# Patient Record
Sex: FEMALE | Race: WHITE | Employment: OTHER | URBAN - METROPOLITAN AREA
[De-identification: names, ages, dates, MRNs, and addresses within clinical notes are randomized per-mention and may not be internally consistent; named-entity substitution may affect disease eponyms.]

---

## 2024-04-09 ENCOUNTER — OFFICE VISIT (OUTPATIENT)
Dept: INTERNAL MEDICINE CLINIC | Facility: CLINIC | Age: 74
End: 2024-04-09
Payer: COMMERCIAL

## 2024-04-09 VITALS
SYSTOLIC BLOOD PRESSURE: 108 MMHG | BODY MASS INDEX: 24.54 KG/M2 | WEIGHT: 125 LBS | TEMPERATURE: 98.5 F | HEIGHT: 60 IN | DIASTOLIC BLOOD PRESSURE: 63 MMHG | HEART RATE: 76 BPM

## 2024-04-09 DIAGNOSIS — H40.013 OPEN ANGLE WITH BORDERLINE FINDINGS, LOW RISK, BILATERAL: ICD-10-CM

## 2024-04-09 DIAGNOSIS — Z76.89 ENCOUNTER TO ESTABLISH CARE: Primary | ICD-10-CM

## 2024-04-09 DIAGNOSIS — E87.6 HYPOKALEMIA: ICD-10-CM

## 2024-04-09 DIAGNOSIS — Z00.00 ANNUAL PHYSICAL EXAM: ICD-10-CM

## 2024-04-09 DIAGNOSIS — Z12.31 ENCOUNTER FOR SCREENING MAMMOGRAM FOR BREAST CANCER: ICD-10-CM

## 2024-04-09 DIAGNOSIS — M81.6 LOCALIZED OSTEOPOROSIS WITHOUT CURRENT PATHOLOGICAL FRACTURE: ICD-10-CM

## 2024-04-09 DIAGNOSIS — E78.5 DYSLIPIDEMIA: ICD-10-CM

## 2024-04-09 DIAGNOSIS — Z11.59 NEED FOR HEPATITIS C SCREENING TEST: ICD-10-CM

## 2024-04-09 PROBLEM — I5A MYOCARDIAL INJURY: Status: ACTIVE | Noted: 2023-10-20

## 2024-04-09 PROBLEM — Z78.9 VEGETARIAN DIET: Status: ACTIVE | Noted: 2022-02-15

## 2024-04-09 PROBLEM — H02.834 DERMATOCHALASIS OF BOTH UPPER EYELIDS: Status: ACTIVE | Noted: 2023-03-09

## 2024-04-09 PROBLEM — R10.9 RIGHT FLANK PAIN: Status: ACTIVE | Noted: 2023-10-20

## 2024-04-09 PROBLEM — M81.0 OSTEOPOROSIS: Status: ACTIVE | Noted: 2023-04-04

## 2024-04-09 PROBLEM — R79.89 ELEVATED TROPONIN LEVEL NOT DUE MYOCARDIAL INFARCTION: Status: RESOLVED | Noted: 2023-10-23 | Resolved: 2024-04-09

## 2024-04-09 PROBLEM — R53.82 CHRONIC FATIGUE: Status: ACTIVE | Noted: 2022-02-15

## 2024-04-09 PROBLEM — H49.9 OPHTHALMOPLEGIA: Status: ACTIVE | Noted: 2023-03-09

## 2024-04-09 PROBLEM — R79.89 ELEVATED TROPONIN LEVEL NOT DUE MYOCARDIAL INFARCTION: Status: ACTIVE | Noted: 2023-10-23

## 2024-04-09 PROBLEM — M35.01 KERATOCONJUNCTIVITIS SICCA (HCC): Status: ACTIVE | Noted: 2023-03-09

## 2024-04-09 PROBLEM — H16.229 KERATOCONJUNCTIVITIS SICCA: Status: ACTIVE | Noted: 2023-03-09

## 2024-04-09 PROBLEM — R10.11 RUQ PAIN: Status: ACTIVE | Noted: 2023-10-20

## 2024-04-09 PROBLEM — H02.831 DERMATOCHALASIS OF BOTH UPPER EYELIDS: Status: ACTIVE | Noted: 2023-03-09

## 2024-04-09 PROBLEM — H25.13 AGE-RELATED NUCLEAR CATARACT OF BOTH EYES: Status: ACTIVE | Noted: 2023-03-09

## 2024-04-09 PROBLEM — E78.00 HYPERCHOLESTEREMIA: Status: ACTIVE | Noted: 2022-02-15

## 2024-04-09 PROBLEM — I5A MYOCARDIAL INJURY: Status: RESOLVED | Noted: 2023-10-20 | Resolved: 2024-04-09

## 2024-04-09 PROCEDURE — 99204 OFFICE O/P NEW MOD 45 MIN: CPT | Performed by: STUDENT IN AN ORGANIZED HEALTH CARE EDUCATION/TRAINING PROGRAM

## 2024-04-09 NOTE — PROGRESS NOTES
INTERNAL MEDICINE OFFICE VISIT NOTE  [facility] Internal Medicine [place]    NAME: Mansi Jensen  AGE: 73 y.o. SEX: female    DATE OF ENCOUNTER: 4/9/2024       Chief Complaint   Patient presents with    Establish Care     New Patient to [facility]      Previously following with a PCP at [facility].  Last visit - 11/2023    Previously diagnosed with, but not limited to:  Osteoporosis, dyslipidemia, hypokalemia cataracts of both eyes,      NO Mental Health hx    Shx: denies current or former Tobacco / vaping / marijuana / illict drug use  Alcohol use: Denies   Environmental exposures: NK  Retired - Owned [employer]   - less than a year,  Kids: 3 adults that live in [place]    Labs or imaging reports: Reviewed    Review of Systems  10 point ROS negative except per HPI    OBJECTIVE:  Vitals:    04/09/24 1518   BP: 108/63   BP Location: Left arm   Patient Position: Sitting   Cuff Size: Standard   Pulse: 76   Temp: 98.5 °F (36.9 °C)   Weight: 56.7 kg (125 lb)   Height: 5' (1.524 m)       Physical Exam:   GENERAL: NAD, Normal appearance.    NEUROLOGIC:  Alert/oriented x3.  HEENT:  NC/AT, no scleral icterus  CARDIAC:  RRR, +S1/S2  PULMONARY:  non-labored breathing    ASSESSMENT/PLAN:    1. Encounter to establish care    2. Need for hepatitis C screening test  -     Hepatitis C Antibody; Future    3. Encounter for screening mammogram for breast cancer    4. Localized osteoporosis without current pathological fracture  -     Vitamin D 25 hydroxy; Future    5. Annual physical exam  -     CBC; Future  -     Comprehensive metabolic panel; Future  -     TSH, 3rd generation with Free T4 reflex; Future  -     Hemoglobin A1C; Future  -     Lipid Panel with Direct LDL reflex; Future    6. Dyslipidemia  -     TSH, 3rd generation with Free T4 reflex; Future  -     Lipid Panel with Direct LDL reflex; Future    7. Hypokalemia  -     Comprehensive metabolic panel; Future    8. Open angle with borderline findings, low  risk, bilateral  Assessment & Plan:  Previously evaluated by ophthalmology however lost to follow-up.  She reports she is scheduled to establish with a new ophthalmologist in the next few weeks          Return in about 2 weeks (around 4/23/2024) for Annual physical.    No current outpatient medications on file.    TCM Call       None          TCM Call       None               Sandor Valles MD  [facility] Internal Medicine [place]    * Please Note: This note was completed in part utilizing a dictation software.  Grammatical errors, random word insertions, spelling mistakes, and incomplete sentences may be an occasional consequence of this system secondary to software limitations, ambient noise, and hardware issues.  If you have any questions or concerns about the content, text, or information contained within the body of this dictation, please contact the provider for clarification.**

## 2024-04-09 NOTE — ASSESSMENT & PLAN NOTE
Previously evaluated by ophthalmology however lost to follow-up.  She reports she is scheduled to establish with a new ophthalmologist in the next few weeks

## 2024-04-11 ENCOUNTER — APPOINTMENT (OUTPATIENT)
Dept: LAB | Facility: CLINIC | Age: 74
End: 2024-04-11
Payer: COMMERCIAL

## 2024-04-11 DIAGNOSIS — E78.5 DYSLIPIDEMIA: ICD-10-CM

## 2024-04-11 DIAGNOSIS — E87.6 HYPOKALEMIA: ICD-10-CM

## 2024-04-11 DIAGNOSIS — M81.6 LOCALIZED OSTEOPOROSIS WITHOUT CURRENT PATHOLOGICAL FRACTURE: ICD-10-CM

## 2024-04-11 DIAGNOSIS — Z11.59 NEED FOR HEPATITIS C SCREENING TEST: ICD-10-CM

## 2024-04-11 DIAGNOSIS — Z00.00 ANNUAL PHYSICAL EXAM: ICD-10-CM

## 2024-04-11 LAB
25(OH)D3 SERPL-MCNC: 41.4 NG/ML (ref 30–100)
ALBUMIN SERPL BCP-MCNC: 4.2 G/DL (ref 3.5–5)
ALP SERPL-CCNC: 70 U/L (ref 34–104)
ALT SERPL W P-5'-P-CCNC: 18 U/L (ref 7–52)
ANION GAP SERPL CALCULATED.3IONS-SCNC: 11 MMOL/L (ref 4–13)
AST SERPL W P-5'-P-CCNC: 26 U/L (ref 13–39)
BILIRUB SERPL-MCNC: 0.72 MG/DL (ref 0.2–1)
BUN SERPL-MCNC: 11 MG/DL (ref 5–25)
CALCIUM SERPL-MCNC: 9 MG/DL (ref 8.4–10.2)
CHLORIDE SERPL-SCNC: 104 MMOL/L (ref 96–108)
CHOLEST SERPL-MCNC: 231 MG/DL
CO2 SERPL-SCNC: 27 MMOL/L (ref 21–32)
CREAT SERPL-MCNC: 0.53 MG/DL (ref 0.6–1.3)
ERYTHROCYTE [DISTWIDTH] IN BLOOD BY AUTOMATED COUNT: 13 % (ref 11.6–15.1)
EST. AVERAGE GLUCOSE BLD GHB EST-MCNC: 111 MG/DL
GFR SERPL CREATININE-BSD FRML MDRD: 94 ML/MIN/1.73SQ M
GLUCOSE P FAST SERPL-MCNC: 87 MG/DL (ref 65–99)
HBA1C MFR BLD: 5.5 %
HCT VFR BLD AUTO: 37.2 % (ref 34.8–46.1)
HCV AB SER QL: NORMAL
HDLC SERPL-MCNC: 67 MG/DL
HGB BLD-MCNC: 12.5 G/DL (ref 11.5–15.4)
LDLC SERPL CALC-MCNC: 148 MG/DL (ref 0–100)
MCH RBC QN AUTO: 31.8 PG (ref 26.8–34.3)
MCHC RBC AUTO-ENTMCNC: 33.6 G/DL (ref 31.4–37.4)
MCV RBC AUTO: 95 FL (ref 82–98)
PLATELET # BLD AUTO: 278 THOUSANDS/UL (ref 149–390)
PMV BLD AUTO: 10.4 FL (ref 8.9–12.7)
POTASSIUM SERPL-SCNC: 4.2 MMOL/L (ref 3.5–5.3)
PROT SERPL-MCNC: 7 G/DL (ref 6.4–8.4)
RBC # BLD AUTO: 3.93 MILLION/UL (ref 3.81–5.12)
SODIUM SERPL-SCNC: 142 MMOL/L (ref 135–147)
TRIGL SERPL-MCNC: 79 MG/DL
TSH SERPL DL<=0.05 MIU/L-ACNC: 3.9 UIU/ML (ref 0.45–4.5)
WBC # BLD AUTO: 3.85 THOUSAND/UL (ref 4.31–10.16)

## 2024-04-11 PROCEDURE — 86803 HEPATITIS C AB TEST: CPT

## 2024-04-11 PROCEDURE — 84443 ASSAY THYROID STIM HORMONE: CPT

## 2024-04-11 PROCEDURE — 85027 COMPLETE CBC AUTOMATED: CPT

## 2024-04-11 PROCEDURE — 36415 COLL VENOUS BLD VENIPUNCTURE: CPT

## 2024-04-11 PROCEDURE — 83036 HEMOGLOBIN GLYCOSYLATED A1C: CPT

## 2024-04-11 PROCEDURE — 80061 LIPID PANEL: CPT

## 2024-04-11 PROCEDURE — 82306 VITAMIN D 25 HYDROXY: CPT

## 2024-04-11 PROCEDURE — 80053 COMPREHEN METABOLIC PANEL: CPT

## 2024-04-26 ENCOUNTER — OFFICE VISIT (OUTPATIENT)
Dept: INTERNAL MEDICINE CLINIC | Facility: CLINIC | Age: 74
End: 2024-04-26
Payer: COMMERCIAL

## 2024-04-26 VITALS
WEIGHT: 126 LBS | TEMPERATURE: 98.4 F | SYSTOLIC BLOOD PRESSURE: 106 MMHG | DIASTOLIC BLOOD PRESSURE: 59 MMHG | HEIGHT: 60 IN | BODY MASS INDEX: 24.74 KG/M2 | HEART RATE: 71 BPM

## 2024-04-26 DIAGNOSIS — M81.0 OSTEOPOROSIS OF LUMBAR SPINE: ICD-10-CM

## 2024-04-26 DIAGNOSIS — M35.01 KERATOCONJUNCTIVITIS SICCA (HCC): ICD-10-CM

## 2024-04-26 DIAGNOSIS — E78.5 DYSLIPIDEMIA: Primary | ICD-10-CM

## 2024-04-26 DIAGNOSIS — Z12.11 COLON CANCER SCREENING: ICD-10-CM

## 2024-04-26 PROBLEM — R53.82 CHRONIC FATIGUE: Status: RESOLVED | Noted: 2022-02-15 | Resolved: 2024-04-26

## 2024-04-26 PROBLEM — E78.00 HYPERCHOLESTEREMIA: Status: RESOLVED | Noted: 2022-02-15 | Resolved: 2024-04-26

## 2024-04-26 PROBLEM — E87.6 HYPOKALEMIA: Status: RESOLVED | Noted: 2023-10-20 | Resolved: 2024-04-26

## 2024-04-26 PROBLEM — M85.80 OSTEOPENIA: Status: ACTIVE | Noted: 2023-04-04

## 2024-04-26 PROBLEM — R10.11 RUQ PAIN: Status: RESOLVED | Noted: 2023-10-20 | Resolved: 2024-04-26

## 2024-04-26 PROCEDURE — 99214 OFFICE O/P EST MOD 30 MIN: CPT | Performed by: STUDENT IN AN ORGANIZED HEALTH CARE EDUCATION/TRAINING PROGRAM

## 2024-04-26 PROCEDURE — G0439 PPPS, SUBSEQ VISIT: HCPCS | Performed by: STUDENT IN AN ORGANIZED HEALTH CARE EDUCATION/TRAINING PROGRAM

## 2024-04-26 PROCEDURE — 3288F FALL RISK ASSESSMENT DOCD: CPT | Performed by: STUDENT IN AN ORGANIZED HEALTH CARE EDUCATION/TRAINING PROGRAM

## 2024-04-26 PROCEDURE — 1125F AMNT PAIN NOTED PAIN PRSNT: CPT | Performed by: STUDENT IN AN ORGANIZED HEALTH CARE EDUCATION/TRAINING PROGRAM

## 2024-04-26 PROCEDURE — 1170F FXNL STATUS ASSESSED: CPT | Performed by: STUDENT IN AN ORGANIZED HEALTH CARE EDUCATION/TRAINING PROGRAM

## 2024-04-26 PROCEDURE — 1159F MED LIST DOCD IN RCRD: CPT | Performed by: STUDENT IN AN ORGANIZED HEALTH CARE EDUCATION/TRAINING PROGRAM

## 2024-04-26 PROCEDURE — 1160F RVW MEDS BY RX/DR IN RCRD: CPT | Performed by: STUDENT IN AN ORGANIZED HEALTH CARE EDUCATION/TRAINING PROGRAM

## 2024-04-26 PROCEDURE — 3725F SCREEN DEPRESSION PERFORMED: CPT | Performed by: STUDENT IN AN ORGANIZED HEALTH CARE EDUCATION/TRAINING PROGRAM

## 2024-04-26 NOTE — PROGRESS NOTES
Assessment and Plan:     Problem List Items Addressed This Visit          Musculoskeletal and Integument    Osteoporosis of lumbar spine     She is currently on OTC vitamin D, currently not on calcium  We discussed initiation of calcium which she prefers to buy over-the-counter  -Additionally will start her on Fosamax 70 milligrams once a week.  Patient advised on proper technique to take this.  Patient voiced understanding.         Relevant Medications    calcium carbonate (OS-CODY) 600 MG tablet    alendronate (Fosamax) 70 mg tablet       Eye    Keratoconjunctivitis sicca (HCC)     She reports a long history of dry eyes  She is scheduled to see an ophthalmologist in a few weeks            Other    Dyslipidemia - Primary     Lab Results   Component Value Date    LDLCALC 148 (H) 04/11/2024     Ascvd RISK 9.8%  We discussed initiation of statin however she is reluctant to proceed at this time.    She prefers to do a trial of red yeast rice for some time.    We have agreed on repeating a lipid panel 1 week before her next visit in 6 months to help with guide our decision process during her next visit         Relevant Orders    Lipid Panel with Direct LDL reflex     Other Visit Diagnoses       Colon cancer screening        Relevant Orders    Cologuard             Preventive health issues were discussed with patient, and age appropriate screening tests were ordered as noted in patient's After Visit Summary.  Personalized health advice and appropriate referrals for health education or preventive services given if needed, as noted in patient's After Visit Summary.     History of Present Illness:     Patient presents for a Medicare Wellness Visit    HPI   Patient Care Team:  Sandor Valles MD as PCP - General (Internal Medicine)  Sandor Valles MD as PCP - PCP-[facility] ([facility])     Review of Systems:     Review of Systems     Problem List:     Patient Active Problem List   Diagnosis    Age-related nuclear cataract  of both eyes    Chronic fatigue    Dermatochalasis of both upper eyelids    Dyslipidemia    Hypercholesteremia    Hypokalemia    Keratoconjunctivitis sicca (HCC)    Open angle with borderline findings, low risk, bilateral    Ophthalmoplegia of left eye    Osteoporosis    RUQ pain    Vegetarian diet      Past Medical and Surgical History:     No past medical history on file.  No past surgical history on file.   Family History:     No family history on file.   Social History:     Social History     Socioeconomic History    Marital status: Single     Spouse name: None    Number of children: None    Years of education: None    Highest education level: None   Occupational History    None   Tobacco Use    Smoking status: Never    Smokeless tobacco: Never   Vaping Use    Vaping status: Never Used   Substance and Sexual Activity    Alcohol use: Never    Drug use: Never    Sexual activity: None   Other Topics Concern    None   Social History Narrative    None     Social Determinants of Health     Financial Resource Strain: Low Risk  (4/26/2024)    Overall Financial Resource Strain (CARDIA)     Difficulty of Paying Living Expenses: Not very hard   Food Insecurity: No Food Insecurity (10/20/2023)    Received from [facility]    Hunger Vital Sign     Worried About Running Out of Food in the Last Year: Never true     Ran Out of Food in the Last Year: Never true   Transportation Needs: No Transportation Needs (4/26/2024)    PRAPARE - Transportation     Lack of Transportation (Medical): No     Lack of Transportation (Non-Medical): No   Physical Activity: Not on file   Stress: Not on file   Social Connections: Not on file   Intimate Partner Violence: Not At Risk (10/20/2023)    Received from [facility]    Humiliation, Afraid, Rape, and Kick questionnaire     Fear of Current or Ex-Partner: No     Emotionally Abused: No     Physically Abused: No     Sexually Abused: No   Housing Stability: Unknown  (4/26/2024)    Housing Stability Vital Sign     Unable to Pay for Housing in the Last Year: No     Number of Places Lived in the Last Year: Not on file     Unstable Housing in the Last Year: No      Medications and Allergies:     No current outpatient medications on file.     No current facility-administered medications for this visit.     No Known Allergies   Immunizations:     Immunization History   Administered Date(s) Administered    COVID-19 PFIZER VACCINE 0.3 ML IM 06/02/2021, 06/23/2021      Health Maintenance:         Topic Date Due    Breast Cancer Screening: Mammogram  Never done    Colorectal Cancer Screening  Never done    Hepatitis C Screening  Completed         Topic Date Due    DTaP,Tdap,and Td Vaccines (1 - Tdap) Never done    Pneumococcal Vaccine: 65+ Years (1 of 1 - PCV) Never done    Influenza Vaccine (1) Never done    COVID-19 Vaccine (3 - 2023-24 season) 09/01/2023      Medicare Screening Tests and Risk Assessments:     Mansi is here for her Initial Wellness visit.     Health Risk Assessment:   Patient rates overall health as very good. Patient feels that their physical health rating is same. Patient is satisfied with their life. Eyesight was rated as same. Hearing was rated as same. Patient feels that their emotional and mental health rating is same. Patients states they are never, rarely angry. Patient states they are sometimes unusually tired/fatigued. Pain experienced in the last 7 days has been none. Patient states that she has experienced no weight loss or gain in last 6 months.     Depression Screening:   PHQ-2 Score: 0      Fall Risk Screening:   In the past year, patient has experienced: history of falling in past year    Number of falls: 1  Injured during fall?: Yes    Feels unsteady when standing or walking?: No    Worried about falling?: No      Urinary Incontinence Screening:   Patient has not leaked urine accidently in the last six months.     Home Safety:  Patient does not have  trouble with stairs inside or outside of their home. Patient has working smoke alarms and has working carbon monoxide detector. Home safety hazards include: none.     Nutrition:   Current diet is Regular.     Medications:   Patient is currently taking over-the-counter supplements. OTC medications include: see medication list. Patient is able to manage medications.     Activities of Daily Living (ADLs)/Instrumental Activities of Daily Living (IADLs):   Walk and transfer into and out of bed and chair?: Yes  Dress and groom yourself?: Yes    Bathe or shower yourself?: Yes    Feed yourself? Yes  Do your laundry/housekeeping?: Yes  Manage your money, pay your bills and track your expenses?: Yes  Make your own meals?: Yes    Do your own shopping?: Yes    Previous Hospitalizations:   Any hospitalizations or ED visits within the last 12 months?: Yes    How many hospitalizations have you had in the last year?: 1-2    Advance Care Planning:   Living will: No    Durable POA for healthcare: No    Advanced directive: No    Advanced directive counseling given: Yes    ACP document given: Yes      PREVENTIVE SCREENINGS      Cardiovascular Screening:    General: Screening Not Indicated and History Lipid Disorder      Diabetes Screening:     General: Screening Current      Colorectal Cancer Screening:     General: Risks and Benefits Discussed    Due for: Cologuard      Cervical Cancer Screening:    General: Screening Not Indicated      Osteoporosis Screening:    General: Screening Not Indicated and History Osteoporosis      Lung Cancer Screening:     General: Screening Not Indicated      Hepatitis C Screening:    General: Screening Current    Screening, Brief Intervention, and Referral to Treatment (SBIRT)    Screening  Typical number of drinks in a day: 0  Typical number of drinks in a week: 0  Interpretation: Low risk drinking behavior.    Single Item Drug Screening:  How often have you used an illegal drug (including marijuana) or  a prescription medication for non-medical reasons in the past year? never    Single Item Drug Screen Score: 0  Interpretation: Negative screen for possible drug use disorder    Other Counseling Topics:   Sunscreen and calcium and vitamin D intake.     No results found.     Physical Exam:     /59 (BP Location: Left arm, Patient Position: Sitting, Cuff Size: Standard)   Pulse 71   Temp 98.4 °F (36.9 °C)   Ht 5' (1.524 m)   Wt 57.2 kg (126 lb)   BMI 24.61 kg/m²     Physical Exam     Sandor Valles MD

## 2024-04-26 NOTE — ASSESSMENT & PLAN NOTE
Lab Results   Component Value Date    LDLCALC 148 (H) 04/11/2024     Ascvd RISK 9.8%  We discussed initiation of statin however she is reluctant to proceed at this time.    She prefers to do a trial of red yeast rice for some time.    We have agreed on repeating a lipid panel 1 week before her next visit in 6 months to help with guide our decision process during her next visit

## 2024-04-26 NOTE — ASSESSMENT & PLAN NOTE
She is currently on OTC vitamin D, currently not on calcium  We discussed initiation of calcium which she prefers to buy over-the-counter  -Additionally will start her on Fosamax 70 milligrams once a week.  Patient advised on proper technique to take this.  Patient voiced understanding.

## 2024-04-29 RX ORDER — ALENDRONATE SODIUM 70 MG/1
70 TABLET ORAL
Qty: 12 TABLET | Refills: 3 | Status: SHIPPED | OUTPATIENT
Start: 2024-04-29

## 2024-04-29 RX ORDER — PHENOL 1.4 %
600 AEROSOL, SPRAY (ML) MUCOUS MEMBRANE 2 TIMES DAILY WITH MEALS
Qty: 180 TABLET | Refills: 1 | Status: SHIPPED | OUTPATIENT
Start: 2024-04-29

## 2024-05-01 ENCOUNTER — TELEPHONE (OUTPATIENT)
Age: 74
End: 2024-05-01

## 2024-05-01 ENCOUNTER — TELEPHONE (OUTPATIENT)
Dept: INTERNAL MEDICINE CLINIC | Facility: CLINIC | Age: 74
End: 2024-05-01

## 2024-05-01 NOTE — TELEPHONE ENCOUNTER
Patient would like to speak with you regarding the medications she received. She has several questions regarding the information she read and will not take them until she speaks with you.

## 2024-05-01 NOTE — TELEPHONE ENCOUNTER
Pt called in to schedule an appt tried to help her out she could roughly understand english. But she insists would want to speak to practice Chinese speaking staff. Warm transferred the call was answered by Kirsten. Thanks

## 2024-05-09 PROBLEM — H16.229 KERATOCONJUNCTIVITIS SICCA: Status: RESOLVED | Noted: 2023-03-09 | Resolved: 2024-05-09

## 2024-05-09 PROBLEM — M35.01 KERATOCONJUNCTIVITIS SICCA (HCC): Status: RESOLVED | Noted: 2023-03-09 | Resolved: 2024-05-09

## 2024-05-16 LAB — COLOGUARD RESULT REPORTABLE: NEGATIVE

## 2024-05-17 ENCOUNTER — TELEPHONE (OUTPATIENT)
Age: 74
End: 2024-05-17

## 2024-05-17 NOTE — TELEPHONE ENCOUNTER
Patient stated she received a message regarding her Cologuard test results.    Please review Cologuard done 5/8/24.    Thank you

## 2024-05-17 NOTE — TELEPHONE ENCOUNTER
Called patient back left message about results of Negative Cologuard. Advised any questions or concerns she is to call the office

## 2024-10-28 ENCOUNTER — OFFICE VISIT (OUTPATIENT)
Dept: INTERNAL MEDICINE CLINIC | Facility: CLINIC | Age: 74
End: 2024-10-28
Payer: COMMERCIAL

## 2024-10-28 ENCOUNTER — TELEPHONE (OUTPATIENT)
Dept: ADMINISTRATIVE | Facility: OTHER | Age: 74
End: 2024-10-28

## 2024-10-28 VITALS
SYSTOLIC BLOOD PRESSURE: 110 MMHG | HEART RATE: 64 BPM | HEIGHT: 60 IN | TEMPERATURE: 97.1 F | BODY MASS INDEX: 24.94 KG/M2 | DIASTOLIC BLOOD PRESSURE: 61 MMHG | WEIGHT: 127 LBS

## 2024-10-28 DIAGNOSIS — M81.0 OSTEOPOROSIS OF LUMBAR SPINE: ICD-10-CM

## 2024-10-28 DIAGNOSIS — R35.0 URINARY FREQUENCY: ICD-10-CM

## 2024-10-28 DIAGNOSIS — M67.911 TENDINOPATHY OF RIGHT SHOULDER: Primary | ICD-10-CM

## 2024-10-28 PROCEDURE — 99214 OFFICE O/P EST MOD 30 MIN: CPT | Performed by: STUDENT IN AN ORGANIZED HEALTH CARE EDUCATION/TRAINING PROGRAM

## 2024-10-28 PROCEDURE — G2211 COMPLEX E/M VISIT ADD ON: HCPCS | Performed by: STUDENT IN AN ORGANIZED HEALTH CARE EDUCATION/TRAINING PROGRAM

## 2024-10-28 RX ORDER — ALENDRONATE SODIUM 70 MG/1
70 TABLET ORAL
Qty: 24 TABLET | Refills: 1 | Status: SHIPPED | OUTPATIENT
Start: 2024-10-28

## 2024-10-28 RX ORDER — METHOCARBAMOL 750 MG/1
750 TABLET, FILM COATED ORAL
Qty: 30 TABLET | Refills: 0 | Status: SHIPPED | OUTPATIENT
Start: 2024-10-28 | End: 2024-11-27

## 2024-10-28 RX ORDER — MELOXICAM 15 MG/1
15 TABLET ORAL DAILY
Qty: 30 TABLET | Refills: 0 | Status: SHIPPED | OUTPATIENT
Start: 2024-10-28 | End: 2024-11-27

## 2024-10-28 NOTE — PROGRESS NOTES
INTERNAL MEDICINE OFFICE VISIT NOTE  [facility] Internal Medicine [place]    NAME: Mansi Jensen  AGE: 74 y.o. SEX: female    DATE OF ENCOUNTER:       Chief Complaint   Patient presents with    Follow-up     6 month follow up          Assessment & Plan  Tendinopathy of right shoulder  Complains of persistent mild to moderate right shoulder pain for the last few months.  Feels that this all started after working in the garden preparing some soil for planting over the spring.  She has been using OTC ointments without much improvement  Right shoulder active range of motion is within functional limits however she does experience some pain at the end of the range of shoulder flexion and internal rotation.  Negative drop arm test.  Suspect tendinopathy of the rotator cuff  Proceed with meloxicam 15 mg daily and methocarbamol 750 mg 1 hour before bedtime.  She may extend this up to 4 weeks as necessary.      Orders:    meloxicam (MOBIC) 15 mg tablet; Take 1 tablet (15 mg total) by mouth daily Take 1 tablet in the morning after breakfast - DO NOT REFILL    methocarbamol (Robaxin-750) 750 mg tablet; Take 1 tablet (750 mg total) by mouth daily at bedtime Take 1 tablet 1 hour before bedtime - allow 7-8 hrs of sleep that night.    Urinary frequency  Complains of small-volume urination and increased urinary frequency.  Denies dysuria, urgency or suprapubic discomfort.  Will proceed with ultrasound of her bladder with PVR and a UA    Orders:    US bladder with post void residual; Future    UA w Reflex to Microscopic w Reflex to Culture; Future    Osteoporosis of lumbar spine  Reports she ran out of of alendronate a few weeks ago and was not aware there was a refill available in the pharmacy.  I advised her to  the refill and restarted.  Continue supplemental calcium and vitamin D    Orders:    alendronate (Fosamax) 70 mg tablet; Take 1 tablet (70 mg total) by mouth every 7 days      Return in about 3 months  (around 1/28/2025).      OBJECTIVE:  Vitals:    10/28/24 1010   BP: 110/61   BP Location: Left arm   Patient Position: Sitting   Cuff Size: Standard   Pulse: 64   Temp: (!) 97.1 °F (36.2 °C)   Weight: 57.6 kg (127 lb)   Height: 5' (1.524 m)         Physical Exam:   GENERAL: NAD, Normal appearance.    NEUROLOGIC:  Alert/oriented x3.  HEENT:  NC/AT, no scleral icterus  CARDIAC:  RRR, +S1/S2  PULMONARY:  non-labored breathing        Current Outpatient Medications:     alendronate (Fosamax) 70 mg tablet, Take 1 tablet (70 mg total) by mouth every 7 days, Disp: 24 tablet, Rfl: 1    calcium carbonate (OS-CODY) 600 MG tablet, Take 1 tablet (600 mg total) by mouth 2 (two) times a day with meals, Disp: 180 tablet, Rfl: 1    cholecalciferol 1,000 units tablet, Take 1,000 Units by mouth daily, Disp: , Rfl:     meloxicam (MOBIC) 15 mg tablet, Take 1 tablet (15 mg total) by mouth daily Take 1 tablet in the morning after breakfast - DO NOT REFILL, Disp: 30 tablet, Rfl: 0    methocarbamol (Robaxin-750) 750 mg tablet, Take 1 tablet (750 mg total) by mouth daily at bedtime Take 1 tablet 1 hour before bedtime - allow 7-8 hrs of sleep that night., Disp: 30 tablet, Rfl: 0    Patient Active Problem List   Diagnosis    Age-related nuclear cataract of both eyes    Dermatochalasis of both upper eyelids    Dyslipidemia    Open angle with borderline findings, low risk, bilateral    Ophthalmoplegia of left eye    Osteoporosis of lumbar spine    Vegetarian diet    Tendinopathy of right shoulder    Urinary frequency             Sandor Valles MD  [facility] Internal Medicine [place]    * Please Note: This note was completed in part utilizing a dictation software.  Grammatical errors, random word insertions, spelling mistakes, and incomplete sentences may be an occasional consequence of this system secondary to software limitations, ambient noise, and hardware issues.  If you have any questions or concerns about the content, text, or  information contained within the body of this dictation, please contact the provider for clarification.**

## 2024-10-28 NOTE — ASSESSMENT & PLAN NOTE
Complains of small-volume urination and increased urinary frequency.  Denies dysuria, urgency or suprapubic discomfort.  Will proceed with ultrasound of her bladder with PVR and a UA    Orders:    US bladder with post void residual; Future    UA w Reflex to Microscopic w Reflex to Culture; Future

## 2024-10-28 NOTE — ASSESSMENT & PLAN NOTE
Reports she ran out of of alendronate a few weeks ago and was not aware there was a refill available in the pharmacy.  I advised her to  the refill and restarted.  Continue supplemental calcium and vitamin D    Orders:    alendronate (Fosamax) 70 mg tablet; Take 1 tablet (70 mg total) by mouth every 7 days

## 2024-10-28 NOTE — ASSESSMENT & PLAN NOTE
Complains of persistent mild to moderate right shoulder pain for the last few months.  Feels that this all started after working in the garden preparing some soil for planting over the spring.  She has been using OTC ointments without much improvement  Right shoulder active range of motion is within functional limits however she does experience some pain at the end of the range of shoulder flexion and internal rotation.  Negative drop arm test.  Suspect tendinopathy of the rotator cuff  Proceed with meloxicam 15 mg daily and methocarbamol 750 mg 1 hour before bedtime.  She may extend this up to 4 weeks as necessary.      Orders:    meloxicam (MOBIC) 15 mg tablet; Take 1 tablet (15 mg total) by mouth daily Take 1 tablet in the morning after breakfast - DO NOT REFILL    methocarbamol (Robaxin-750) 750 mg tablet; Take 1 tablet (750 mg total) by mouth daily at bedtime Take 1 tablet 1 hour before bedtime - allow 7-8 hrs of sleep that night.

## 2024-10-28 NOTE — TELEPHONE ENCOUNTER
Upon review of the In Basket request we were able to locate, review, and update the patient chart as requested for DEXA Scan.    Any additional questions or concerns should be emailed to the Practice Liaisons via the appropriate education email address, please do not reply via In Basket.    Thank you  Deacon Dean MA   [facility]

## 2024-10-28 NOTE — TELEPHONE ENCOUNTER
----- Message from Carmen SUN sent at 10/28/2024 11:05 AM EDT -----  Regarding: care gap request  10/28/24 11:05 AM    Hello, our patient attached above has had DEXA Scan completed/performed. Please assist in updating the patient chart by pulling the Care Everywhere (CE) document. The date of service is 3/7/2023.     Thank you,  CARMEN CAMPOVERDE [facility] [place]

## 2024-11-01 ENCOUNTER — HOSPITAL ENCOUNTER (OUTPATIENT)
Dept: ULTRASOUND IMAGING | Facility: HOSPITAL | Age: 74
Discharge: HOME | End: 2024-11-01
Attending: STUDENT IN AN ORGANIZED HEALTH CARE EDUCATION/TRAINING PROGRAM
Payer: COMMERCIAL

## 2024-11-01 ENCOUNTER — VBI (OUTPATIENT)
Dept: ADMINISTRATIVE | Facility: OTHER | Age: 74
End: 2024-11-01

## 2024-11-01 DIAGNOSIS — R35.0 URINARY FREQUENCY: ICD-10-CM

## 2024-11-01 PROCEDURE — 51798 US URINE CAPACITY MEASURE: CPT

## 2024-11-01 NOTE — TELEPHONE ENCOUNTER
11/01/24 10:09 AM     Chart reviewed for Mammogram ; nothing is submitted to the patient's insurance at this time.     Ana Paula Hilton MA   [facility]

## 2024-11-07 DIAGNOSIS — R35.0 URINARY FREQUENCY: Primary | ICD-10-CM

## 2024-11-08 ENCOUNTER — TELEPHONE (OUTPATIENT)
Dept: INTERNAL MEDICINE CLINIC | Facility: CLINIC | Age: 74
End: 2024-11-08

## 2024-11-08 ENCOUNTER — TELEPHONE (OUTPATIENT)
Age: 74
End: 2024-11-08

## 2024-11-08 NOTE — TELEPHONE ENCOUNTER
Patient called to establish care for Urinary frequency after being referred by PCP.    Pt is scheduled in [place] with AP Piger on 12/6 at 1:40 PM. The office's address was given to the patient.    No further action needed at this time.

## 2024-11-08 NOTE — TELEPHONE ENCOUNTER
----- Message from Sandor Valles MD sent at 11/7/2024  8:57 PM EST -----  Please call the patient regarding her abnormal result. No evidence of urinary retention please advised her to complete the UA.   I'd like her to be evaluated by urology also. Referral ordered.

## 2024-11-12 ENCOUNTER — TELEPHONE (OUTPATIENT)
Dept: INTERNAL MEDICINE CLINIC | Facility: CLINIC | Age: 74
End: 2024-11-12

## 2024-11-12 NOTE — TELEPHONE ENCOUNTER
Called spoke with patient gave results and recommendations and patient understood     ----- Message from Sandor Valles MD sent at 11/7/2024  8:57 PM EST -----  Please call the patient regarding her abnormal result. No evidence of urinary retention please advised her to complete the UA.   I'd like her to be evaluated by urology also. Referral ordered.

## 2025-01-08 ENCOUNTER — OFFICE VISIT (OUTPATIENT)
Dept: INTERNAL MEDICINE CLINIC | Facility: CLINIC | Age: 75
End: 2025-01-08
Payer: COMMERCIAL

## 2025-01-08 VITALS
WEIGHT: 123 LBS | SYSTOLIC BLOOD PRESSURE: 115 MMHG | HEIGHT: 60 IN | TEMPERATURE: 98 F | DIASTOLIC BLOOD PRESSURE: 86 MMHG | HEART RATE: 83 BPM | BODY MASS INDEX: 24.15 KG/M2

## 2025-01-08 DIAGNOSIS — H93.8X1 LUMP OF EAR, RIGHT: Primary | ICD-10-CM

## 2025-01-08 PROCEDURE — 99213 OFFICE O/P EST LOW 20 MIN: CPT | Performed by: STUDENT IN AN ORGANIZED HEALTH CARE EDUCATION/TRAINING PROGRAM

## 2025-01-09 PROBLEM — H93.8X1: Status: ACTIVE | Noted: 2025-01-09

## 2025-01-09 NOTE — ASSESSMENT & PLAN NOTE
Presents for evaluation of a lump under her right ear that has been present for almost 3 weeks now.  Reports it progressively grew during the first 2 weeks however in the last week she feels that has slightly decreased in size after she started applying cold and hot compresses.  Denies any recent upper respiratory infections.  Denies ear pain, sore throat, tooth ache.   There is a small mass measuring approximately 2 cm on her right ear that is tender to touch.  Nonfluctuant.  Non erythematous.  Not warm to touch.  There is no evidence of a concurrent head or neck infection.  Suspect reactive lymphadenopathy  Will hold off on imaging at this time considering that is improving as for per report.  Advised her to continue with warm compresses and ibuprofen as needed for pain and inflammation  We will follow-up with her in approximately 4 weeks during her visit to follow-up on chronic conditions

## 2025-01-09 NOTE — PROGRESS NOTES
INTERNAL MEDICINE OFFICE VISIT NOTE  [facility] Internal Medicine [place]    NAME: Mansi Jensen  AGE: 74 y.o. SEX: female    DATE OF ENCOUNTER:       Chief Complaint   Patient presents with    Follow-up     3 month follow up   Bump under right ear           Assessment & Plan  Lump of ear, right  Presents for evaluation of a lump under her right ear that has been present for almost 3 weeks now.  Reports it progressively grew during the first 2 weeks however in the last week she feels that has slightly decreased in size after she started applying cold and hot compresses.  Denies any recent upper respiratory infections.  Denies ear pain, sore throat, tooth ache.   There is a small mass measuring approximately 2 cm on her right ear that is tender to touch.  Nonfluctuant.  Non erythematous.  Not warm to touch.  There is no evidence of a concurrent head or neck infection.  Suspect reactive lymphadenopathy  Will hold off on imaging at this time considering that is improving as for per report.  Advised her to continue with warm compresses and ibuprofen as needed for pain and inflammation  We will follow-up with her in approximately 4 weeks during her visit to follow-up on chronic conditions             Return in about 4 weeks (around 2/5/2025), or f/u lump under right ear, for f/u on chronic conditions.      OBJECTIVE:  Vitals:    01/08/25 1038   BP: 115/86   BP Location: Left arm   Patient Position: Sitting   Cuff Size: Standard   Pulse: 83   Temp: 98 °F (36.7 °C)   Weight: 55.8 kg (123 lb)   Height: 5' (1.524 m)         Physical Exam:   GENERAL: NAD, Normal appearance.    NEUROLOGIC:  Alert/oriented x3.  HEENT:  NC/AT, no scleral icterus, as above.  CARDIAC:  RRR, +S1/S2  PULMONARY:  non-labored breathing        Current Outpatient Medications:     alendronate (Fosamax) 70 mg tablet, Take 1 tablet (70 mg total) by mouth every 7 days (Patient not taking: Reported on 1/8/2025), Disp: 24 tablet, Rfl: 1    calcium  carbonate (OS-CODY) 600 MG tablet, Take 1 tablet (600 mg total) by mouth 2 (two) times a day with meals (Patient not taking: Reported on 1/8/2025), Disp: 180 tablet, Rfl: 1    cholecalciferol 1,000 units tablet, Take 1,000 Units by mouth daily (Patient not taking: Reported on 1/8/2025), Disp: , Rfl:     meloxicam (MOBIC) 15 mg tablet, Take 1 tablet (15 mg total) by mouth daily Take 1 tablet in the morning after breakfast - DO NOT REFILL, Disp: 30 tablet, Rfl: 0    methocarbamol (Robaxin-750) 750 mg tablet, Take 1 tablet (750 mg total) by mouth daily at bedtime Take 1 tablet 1 hour before bedtime - allow 7-8 hrs of sleep that night., Disp: 30 tablet, Rfl: 0    Patient Active Problem List   Diagnosis    Age-related nuclear cataract of both eyes    Dermatochalasis of both upper eyelids    Dyslipidemia    Open angle with borderline findings, low risk, bilateral    Ophthalmoplegia of left eye    Osteoporosis of lumbar spine    Vegetarian diet    Tendinopathy of right shoulder    Urinary frequency    Lump of ear, right           Sandor Valles MD  [facility] Internal Medicine [place]    * Please Note: This note was completed in part utilizing a dictation software.  Grammatical errors, random word insertions, spelling mistakes, and incomplete sentences may be an occasional consequence of this system secondary to software limitations, ambient noise, and hardware issues.  If you have any questions or concerns about the content, text, or information contained within the body of this dictation, please contact the provider for clarification.**

## 2025-01-22 ENCOUNTER — RESULTS FOLLOW-UP (OUTPATIENT)
Dept: INTERNAL MEDICINE CLINIC | Facility: CLINIC | Age: 75
End: 2025-01-22

## 2025-01-22 ENCOUNTER — OFFICE VISIT (OUTPATIENT)
Dept: INTERNAL MEDICINE CLINIC | Facility: CLINIC | Age: 75
End: 2025-01-22
Payer: COMMERCIAL

## 2025-01-22 ENCOUNTER — HOSPITAL ENCOUNTER (OUTPATIENT)
Dept: ULTRASOUND IMAGING | Facility: HOSPITAL | Age: 75
Discharge: HOME | End: 2025-01-22
Attending: STUDENT IN AN ORGANIZED HEALTH CARE EDUCATION/TRAINING PROGRAM
Payer: COMMERCIAL

## 2025-01-22 VITALS
HEIGHT: 57 IN | WEIGHT: 124.2 LBS | DIASTOLIC BLOOD PRESSURE: 70 MMHG | OXYGEN SATURATION: 99 % | SYSTOLIC BLOOD PRESSURE: 112 MMHG | BODY MASS INDEX: 26.8 KG/M2 | TEMPERATURE: 97.7 F | HEART RATE: 82 BPM

## 2025-01-22 DIAGNOSIS — H93.8X1 MASS OF RIGHT EAR: ICD-10-CM

## 2025-01-22 DIAGNOSIS — H93.8X1 MASS OF RIGHT EAR: Primary | ICD-10-CM

## 2025-01-22 PROCEDURE — 76536 US EXAM OF HEAD AND NECK: CPT

## 2025-01-22 PROCEDURE — 99214 OFFICE O/P EST MOD 30 MIN: CPT | Performed by: STUDENT IN AN ORGANIZED HEALTH CARE EDUCATION/TRAINING PROGRAM

## 2025-01-22 PROCEDURE — G2211 COMPLEX E/M VISIT ADD ON: HCPCS | Performed by: STUDENT IN AN ORGANIZED HEALTH CARE EDUCATION/TRAINING PROGRAM

## 2025-01-22 NOTE — ASSESSMENT & PLAN NOTE
Initially evaluated on 1/08 and suspected to be reactive lymphadenopathy however she reports the mass has continued to grow and now is painful.  She denies any ear pain, postauricular pain, difficulty swallowing, fevers, chills  Masses now measuring approximately 4 cm from 2 cm on 1/08, localized on the right ear.  Slightly tender to touch.  Nonfluctuant, nonerythematous, not warm to touch  Will proceed with an ultrasound to further characterize  We will also refer to ENT     Orders:    US head neck soft tissue; Future    Ambulatory Referral to Otolaryngology; Future

## 2025-01-22 NOTE — PROGRESS NOTES
" INTERNAL MEDICINE OFFICE VISIT NOTE  [facility] Internal Medicine [place]    NAME: Mansi Jensen  AGE: 74 y.o. SEX: female    DATE OF ENCOUNTER:       Chief Complaint   Patient presents with    Mass     Per pt on her right face by her right ear, per pt is painful, itchy and is getting bigger since last visit.         Assessment & Plan  Mass of right ear  Initially evaluated on 1/08 and suspected to be reactive lymphadenopathy however she reports the mass has continued to grow and now is painful.  She denies any ear pain, postauricular pain, difficulty swallowing, fevers, chills  Masses now measuring approximately 4 cm from 2 cm on 1/08, localized on the right ear.  Slightly tender to touch.  Nonfluctuant, nonerythematous, not warm to touch  Will proceed with an ultrasound to further characterize  We will also refer to ENT     Orders:    US head neck soft tissue; Future    Ambulatory Referral to Otolaryngology; Future      No follow-ups on file.      OBJECTIVE:  Vitals:    01/22/25 1004   BP: 112/70   BP Location: Left arm   Patient Position: Sitting   Cuff Size: Adult   Pulse: 82   Temp: 97.7 °F (36.5 °C)   TempSrc: Temporal   SpO2: 99%   Weight: 56.3 kg (124 lb 3.2 oz)   Height: 4' 9\" (1.448 m)         Physical Exam:   GENERAL: NAD, Normal appearance.    NEUROLOGIC:  Alert/oriented x3.  HEENT:  NC/AT, no scleral icterus, as above  CARDIAC:  RRR, +S1/S2  PULMONARY:  non-labored breathing        Current Outpatient Medications:     alendronate (Fosamax) 70 mg tablet, Take 1 tablet (70 mg total) by mouth every 7 days (Patient not taking: Reported on 1/22/2025), Disp: 24 tablet, Rfl: 1    calcium carbonate (OS-CODY) 600 MG tablet, Take 1 tablet (600 mg total) by mouth 2 (two) times a day with meals (Patient not taking: Reported on 1/22/2025), Disp: 180 tablet, Rfl: 1    cholecalciferol 1,000 units tablet, Take 1,000 Units by mouth daily (Patient not taking: Reported on 1/22/2025), Disp: , Rfl:     meloxicam " (MOBIC) 15 mg tablet, Take 1 tablet (15 mg total) by mouth daily Take 1 tablet in the morning after breakfast - DO NOT REFILL, Disp: 30 tablet, Rfl: 0    methocarbamol (Robaxin-750) 750 mg tablet, Take 1 tablet (750 mg total) by mouth daily at bedtime Take 1 tablet 1 hour before bedtime - allow 7-8 hrs of sleep that night., Disp: 30 tablet, Rfl: 0    Patient Active Problem List   Diagnosis    Age-related nuclear cataract of both eyes    Dermatochalasis of both upper eyelids    Dyslipidemia    Open angle with borderline findings, low risk, bilateral    Ophthalmoplegia of left eye    Osteoporosis of lumbar spine    Vegetarian diet    Tendinopathy of right shoulder    Urinary frequency    Mass under right ear             Sandor Valles MD  [facility] Internal Medicine [place]    * Please Note: This note was completed in part utilizing a dictation software.  Grammatical errors, random word insertions, spelling mistakes, and incomplete sentences may be an occasional consequence of this system secondary to software limitations, ambient noise, and hardware issues.  If you have any questions or concerns about the content, text, or information contained within the body of this dictation, please contact the provider for clarification.**

## 2025-01-23 ENCOUNTER — TELEPHONE (OUTPATIENT)
Age: 75
End: 2025-01-23

## 2025-01-23 NOTE — TELEPHONE ENCOUNTER
Patient calling in regarding results of ultrasound     Advised provider has not yet reviewed them   Jarred send message to provider advising call back to discuss results

## 2025-01-24 ENCOUNTER — TELEPHONE (OUTPATIENT)
Age: 75
End: 2025-01-24

## 2025-01-24 NOTE — TELEPHONE ENCOUNTER
Patient called regarding US results. Read Dr. Valles's note to her: Please call the patient regarding her abnormal result. US shows inflammation of the parotid gland. I'd like her to be evaluated by ENT as recommended during her visit.     Patient said that she received those results but is concerned about when this can be evaluated. Unable to understand patient's concerns, language barrier.  Did not want a language line interpretor. Warm transfer to the office.

## 2025-01-24 NOTE — TELEPHONE ENCOUNTER
Called spoke with patient gave results and recommendations and patient understood     ----- Message from Sandor Valles MD sent at 1/22/2025  9:10 PM EST -----  Please call the patient regarding her abnormal result. US shows inflammation of the parotid gland. I'd like her to be evaluated by ENT as recommended during her visit.

## 2025-01-30 ENCOUNTER — APPOINTMENT (OUTPATIENT)
Dept: LAB | Facility: CLINIC | Age: 75
End: 2025-01-30
Payer: COMMERCIAL

## 2025-01-30 DIAGNOSIS — K11.1 PAROTID GLAND ENLARGEMENT: ICD-10-CM

## 2025-01-30 DIAGNOSIS — K11.20 PAROTITIS: ICD-10-CM

## 2025-01-30 LAB
ANION GAP SERPL CALCULATED.3IONS-SCNC: 12 MMOL/L (ref 4–13)
BASOPHILS # BLD AUTO: 0.05 THOUSANDS/ΜL (ref 0–0.1)
BASOPHILS NFR BLD AUTO: 1 % (ref 0–1)
BUN SERPL-MCNC: 17 MG/DL (ref 5–25)
CALCIUM SERPL-MCNC: 9.5 MG/DL (ref 8.4–10.2)
CHLORIDE SERPL-SCNC: 103 MMOL/L (ref 96–108)
CO2 SERPL-SCNC: 26 MMOL/L (ref 21–32)
CREAT SERPL-MCNC: 0.55 MG/DL (ref 0.6–1.3)
EOSINOPHIL # BLD AUTO: 0.14 THOUSAND/ΜL (ref 0–0.61)
EOSINOPHIL NFR BLD AUTO: 3 % (ref 0–6)
ERYTHROCYTE [DISTWIDTH] IN BLOOD BY AUTOMATED COUNT: 13.2 % (ref 11.6–15.1)
GFR SERPL CREATININE-BSD FRML MDRD: 92 ML/MIN/1.73SQ M
GLUCOSE SERPL-MCNC: 86 MG/DL (ref 65–140)
HCT VFR BLD AUTO: 37.8 % (ref 34.8–46.1)
HGB BLD-MCNC: 12.5 G/DL (ref 11.5–15.4)
IMM GRANULOCYTES # BLD AUTO: 0.02 THOUSAND/UL (ref 0–0.2)
IMM GRANULOCYTES NFR BLD AUTO: 0 % (ref 0–2)
LYMPHOCYTES # BLD AUTO: 1.87 THOUSANDS/ΜL (ref 0.6–4.47)
LYMPHOCYTES NFR BLD AUTO: 38 % (ref 14–44)
MCH RBC QN AUTO: 31.2 PG (ref 26.8–34.3)
MCHC RBC AUTO-ENTMCNC: 33.1 G/DL (ref 31.4–37.4)
MCV RBC AUTO: 94 FL (ref 82–98)
MONOCYTES # BLD AUTO: 0.52 THOUSAND/ΜL (ref 0.17–1.22)
MONOCYTES NFR BLD AUTO: 11 % (ref 4–12)
NEUTROPHILS # BLD AUTO: 2.37 THOUSANDS/ΜL (ref 1.85–7.62)
NEUTS SEG NFR BLD AUTO: 47 % (ref 43–75)
NRBC BLD AUTO-RTO: 0 /100 WBCS
PLATELET # BLD AUTO: 268 THOUSANDS/UL (ref 149–390)
PMV BLD AUTO: 10.2 FL (ref 8.9–12.7)
POTASSIUM SERPL-SCNC: 4 MMOL/L (ref 3.5–5.3)
RBC # BLD AUTO: 4.01 MILLION/UL (ref 3.81–5.12)
SODIUM SERPL-SCNC: 141 MMOL/L (ref 135–147)
WBC # BLD AUTO: 4.97 THOUSAND/UL (ref 4.31–10.16)

## 2025-01-30 PROCEDURE — 80048 BASIC METABOLIC PNL TOTAL CA: CPT

## 2025-01-30 PROCEDURE — 36415 COLL VENOUS BLD VENIPUNCTURE: CPT

## 2025-01-30 PROCEDURE — 85025 COMPLETE CBC W/AUTO DIFF WBC: CPT

## 2025-02-06 ENCOUNTER — HOSPITAL ENCOUNTER (OUTPATIENT)
Dept: CT IMAGING | Facility: HOSPITAL | Age: 75
Discharge: HOME | End: 2025-02-06
Attending: OTOLARYNGOLOGY
Payer: COMMERCIAL

## 2025-02-06 DIAGNOSIS — K11.20 PAROTITIS: ICD-10-CM

## 2025-02-06 DIAGNOSIS — K11.1 PAROTID GLAND ENLARGEMENT: ICD-10-CM

## 2025-02-06 PROCEDURE — 70491 CT SOFT TISSUE NECK W/DYE: CPT

## 2025-02-06 RX ADMIN — IOHEXOL 85 ML: 350 INJECTION, SOLUTION INTRAVENOUS at 15:57

## 2025-02-24 ENCOUNTER — TELEPHONE (OUTPATIENT)
Dept: RADIOLOGY | Facility: HOSPITAL | Age: 75
End: 2025-02-24

## 2025-02-24 NOTE — NURSING NOTE
Call placed to pt to discuss upcoming appointment at [facility] Radiology Department.  No answer.  Information left on pts voicemail by .  Pt is having a R Lymph Node Biopsy completed on 3/7/2025.  Pre procedure instructions given including diet and taking own medications.  Instructed pt that she may eat normally and take medications as usual before the procedure.  Reminded pt of the location, date and time of procedure.  Phone number given for pt to call with any questions.

## 2025-03-07 ENCOUNTER — HOSPITAL ENCOUNTER (OUTPATIENT)
Dept: ULTRASOUND IMAGING | Facility: HOSPITAL | Age: 75
Discharge: HOME | End: 2025-03-07
Attending: OTOLARYNGOLOGY
Payer: COMMERCIAL

## 2025-03-07 DIAGNOSIS — K11.8 PAROTID MASS: ICD-10-CM

## 2025-03-07 PROCEDURE — 10005 FNA BX W/US GDN 1ST LES: CPT

## 2025-03-07 PROCEDURE — 88173 CYTOPATH EVAL FNA REPORT: CPT | Performed by: PATHOLOGY

## 2025-03-07 PROCEDURE — 88185 FLOWCYTOMETRY/TC ADD-ON: CPT

## 2025-03-07 PROCEDURE — 88184 FLOWCYTOMETRY/ TC 1 MARKER: CPT | Performed by: OTOLARYNGOLOGY

## 2025-03-07 RX ORDER — LIDOCAINE HYDROCHLORIDE 10 MG/ML
5 INJECTION, SOLUTION EPIDURAL; INFILTRATION; INTRACAUDAL; PERINEURAL ONCE
Status: COMPLETED | OUTPATIENT
Start: 2025-03-07 | End: 2025-03-07

## 2025-03-07 RX ADMIN — LIDOCAINE HYDROCHLORIDE 5 ML: 10 INJECTION, SOLUTION EPIDURAL; INFILTRATION; INTRACAUDAL; PERINEURAL at 10:30

## 2025-03-10 LAB — SCAN RESULT: NORMAL

## 2025-03-10 PROCEDURE — 88173 CYTOPATH EVAL FNA REPORT: CPT | Performed by: PATHOLOGY

## 2025-06-06 ENCOUNTER — TELEPHONE (OUTPATIENT)
Age: 75
End: 2025-06-06

## 2025-06-06 NOTE — TELEPHONE ENCOUNTER
Pt calling in regards to PCP appt. Advised this is urology number. Provided phone number for PCP office and pt will call to speak with them regarding her appt.

## 2025-07-23 ENCOUNTER — OFFICE VISIT (OUTPATIENT)
Dept: INTERNAL MEDICINE CLINIC | Facility: CLINIC | Age: 75
End: 2025-07-23
Payer: COMMERCIAL

## 2025-07-23 VITALS
BODY MASS INDEX: 26.97 KG/M2 | HEIGHT: 57 IN | RESPIRATION RATE: 18 BRPM | HEART RATE: 74 BPM | WEIGHT: 125 LBS | TEMPERATURE: 97.8 F | SYSTOLIC BLOOD PRESSURE: 108 MMHG | OXYGEN SATURATION: 99 % | DIASTOLIC BLOOD PRESSURE: 64 MMHG

## 2025-07-23 DIAGNOSIS — E07.9 THYROID DISEASE: ICD-10-CM

## 2025-07-23 DIAGNOSIS — M81.0 OSTEOPOROSIS OF LUMBAR SPINE: ICD-10-CM

## 2025-07-23 DIAGNOSIS — R73.01 IMPAIRED FASTING GLUCOSE: ICD-10-CM

## 2025-07-23 DIAGNOSIS — E55.9 VITAMIN D DEFICIENCY: ICD-10-CM

## 2025-07-23 DIAGNOSIS — Z00.00 MEDICARE ANNUAL WELLNESS VISIT, SUBSEQUENT: Primary | ICD-10-CM

## 2025-07-23 DIAGNOSIS — H93.8X1 MASS OF RIGHT EAR: ICD-10-CM

## 2025-07-23 DIAGNOSIS — E53.8 B12 DEFICIENCY: ICD-10-CM

## 2025-07-23 DIAGNOSIS — E78.5 DYSLIPIDEMIA: ICD-10-CM

## 2025-07-23 PROCEDURE — 99214 OFFICE O/P EST MOD 30 MIN: CPT | Performed by: STUDENT IN AN ORGANIZED HEALTH CARE EDUCATION/TRAINING PROGRAM

## 2025-07-23 PROCEDURE — G0439 PPPS, SUBSEQ VISIT: HCPCS | Performed by: STUDENT IN AN ORGANIZED HEALTH CARE EDUCATION/TRAINING PROGRAM

## 2025-07-23 PROCEDURE — G0444 DEPRESSION SCREEN ANNUAL: HCPCS | Performed by: STUDENT IN AN ORGANIZED HEALTH CARE EDUCATION/TRAINING PROGRAM

## 2025-07-23 RX ORDER — PHENOL 1.4 %
600 AEROSOL, SPRAY (ML) MUCOUS MEMBRANE 2 TIMES DAILY WITH MEALS
Qty: 180 TABLET | Refills: 1 | Status: SHIPPED | OUTPATIENT
Start: 2025-07-23

## 2025-07-23 RX ORDER — VITAMIN B COMPLEX
1 TABLET ORAL DAILY
COMMUNITY

## 2025-07-23 RX ORDER — ALENDRONATE SODIUM 70 MG/1
70 TABLET ORAL
Qty: 24 TABLET | Refills: 1 | Status: SHIPPED | OUTPATIENT
Start: 2025-07-23

## 2025-08-06 ENCOUNTER — APPOINTMENT (OUTPATIENT)
Dept: LAB | Facility: CLINIC | Age: 75
End: 2025-08-06
Payer: COMMERCIAL